# Patient Record
Sex: FEMALE | NOT HISPANIC OR LATINO | URBAN - METROPOLITAN AREA
[De-identification: names, ages, dates, MRNs, and addresses within clinical notes are randomized per-mention and may not be internally consistent; named-entity substitution may affect disease eponyms.]

---

## 2024-04-22 LAB — BCS RECOMMENDATION EXT: NORMAL

## 2024-04-23 LAB — BCS RECOMMENDATION EXT: NORMAL

## 2024-11-11 ENCOUNTER — OFFICE VISIT (OUTPATIENT)
Dept: FAMILY MEDICINE | Facility: CLINIC | Age: 54
End: 2024-11-11
Payer: COMMERCIAL

## 2024-11-11 VITALS
WEIGHT: 215 LBS | HEIGHT: 69 IN | SYSTOLIC BLOOD PRESSURE: 134 MMHG | RESPIRATION RATE: 16 BRPM | OXYGEN SATURATION: 98 % | HEART RATE: 92 BPM | BODY MASS INDEX: 31.84 KG/M2 | DIASTOLIC BLOOD PRESSURE: 86 MMHG | TEMPERATURE: 98 F

## 2024-11-11 DIAGNOSIS — Z11.59 NEED FOR HEPATITIS C SCREENING TEST: ICD-10-CM

## 2024-11-11 DIAGNOSIS — Z13.6 ENCOUNTER FOR SCREENING FOR CARDIOVASCULAR DISORDERS: ICD-10-CM

## 2024-11-11 DIAGNOSIS — Z76.89 ENCOUNTER TO ESTABLISH CARE: Primary | ICD-10-CM

## 2024-11-11 DIAGNOSIS — E78.2 MIXED HYPERLIPIDEMIA: ICD-10-CM

## 2024-11-11 DIAGNOSIS — K63.5 POLYP OF COLON, UNSPECIFIED PART OF COLON, UNSPECIFIED TYPE: ICD-10-CM

## 2024-11-11 DIAGNOSIS — R73.01 ELEVATED FASTING GLUCOSE: ICD-10-CM

## 2024-11-11 DIAGNOSIS — Z12.31 BREAST CANCER SCREENING BY MAMMOGRAM: ICD-10-CM

## 2024-11-11 DIAGNOSIS — E55.9 VITAMIN D DEFICIENCY: ICD-10-CM

## 2024-11-11 DIAGNOSIS — Z82.49 FAMILY HISTORY OF EARLY CAD: ICD-10-CM

## 2024-11-11 DIAGNOSIS — I10 PRIMARY HYPERTENSION: ICD-10-CM

## 2024-11-11 DIAGNOSIS — M51.360 DEGENERATION OF INTERVERTEBRAL DISC OF LUMBAR REGION WITH DISCOGENIC BACK PAIN: ICD-10-CM

## 2024-11-11 DIAGNOSIS — Z11.4 SCREENING FOR HIV (HUMAN IMMUNODEFICIENCY VIRUS): ICD-10-CM

## 2024-11-11 PROCEDURE — 3075F SYST BP GE 130 - 139MM HG: CPT | Mod: CPTII,,, | Performed by: NURSE PRACTITIONER

## 2024-11-11 PROCEDURE — 1159F MED LIST DOCD IN RCRD: CPT | Mod: CPTII,,, | Performed by: NURSE PRACTITIONER

## 2024-11-11 PROCEDURE — 3079F DIAST BP 80-89 MM HG: CPT | Mod: CPTII,,, | Performed by: NURSE PRACTITIONER

## 2024-11-11 PROCEDURE — 1160F RVW MEDS BY RX/DR IN RCRD: CPT | Mod: CPTII,,, | Performed by: NURSE PRACTITIONER

## 2024-11-11 PROCEDURE — 99204 OFFICE O/P NEW MOD 45 MIN: CPT | Mod: ,,, | Performed by: NURSE PRACTITIONER

## 2024-11-11 PROCEDURE — 4010F ACE/ARB THERAPY RXD/TAKEN: CPT | Mod: CPTII,,, | Performed by: NURSE PRACTITIONER

## 2024-11-11 PROCEDURE — 3008F BODY MASS INDEX DOCD: CPT | Mod: CPTII,,, | Performed by: NURSE PRACTITIONER

## 2024-11-11 RX ORDER — ENALAPRIL MALEATE 10 MG/1
10 TABLET ORAL DAILY
Qty: 90 TABLET | Refills: 3 | Status: SHIPPED | OUTPATIENT
Start: 2024-11-11 | End: 2025-11-11

## 2024-11-11 RX ORDER — ROSUVASTATIN CALCIUM 10 MG/1
10 TABLET, COATED ORAL DAILY
Qty: 90 TABLET | Refills: 3 | Status: SHIPPED | OUTPATIENT
Start: 2024-11-11 | End: 2025-11-11

## 2024-11-11 RX ORDER — ICOSAPENT ETHYL 1 G/1
2 CAPSULE ORAL 2 TIMES DAILY
COMMUNITY
Start: 2024-10-23 | End: 2024-11-11 | Stop reason: SDUPTHER

## 2024-11-11 RX ORDER — ENALAPRIL MALEATE 10 MG/1
10 TABLET ORAL DAILY
COMMUNITY
Start: 2024-10-16 | End: 2024-11-11 | Stop reason: SDUPTHER

## 2024-11-11 RX ORDER — ERGOCALCIFEROL 1.25 MG/1
50000 CAPSULE ORAL
COMMUNITY
Start: 2024-11-04 | End: 2024-11-11 | Stop reason: SDUPTHER

## 2024-11-11 RX ORDER — ICOSAPENT ETHYL 1 G/1
2 CAPSULE ORAL 2 TIMES DAILY
Qty: 360 CAPSULE | Refills: 3 | Status: SHIPPED | OUTPATIENT
Start: 2024-11-11 | End: 2025-11-11

## 2024-11-11 RX ORDER — IBUPROFEN 800 MG/1
800 TABLET ORAL EVERY 6 HOURS PRN
COMMUNITY
Start: 2024-10-16 | End: 2024-11-11 | Stop reason: SDUPTHER

## 2024-11-11 RX ORDER — IBUPROFEN 800 MG/1
800 TABLET ORAL 3 TIMES DAILY PRN
Qty: 90 TABLET | Refills: 11 | Status: SHIPPED | OUTPATIENT
Start: 2024-11-11

## 2024-11-11 RX ORDER — ERGOCALCIFEROL 1.25 MG/1
50000 CAPSULE ORAL
Qty: 24 CAPSULE | Refills: 3 | Status: SHIPPED | OUTPATIENT
Start: 2024-11-11 | End: 2025-11-11

## 2024-11-11 NOTE — ASSESSMENT & PLAN NOTE
Reports remote MRI with bulging discs in her lower back.  Takes ibuprofen 800 mg sparingly as needed.  Completed PT sometime in the past as well.  Will attempt to get previous MRI records.  Okay to refill ibuprofen to take as needed.

## 2024-11-11 NOTE — ASSESSMENT & PLAN NOTE
Coronary artery calcium score screening CT ordered.  Continue Vascepa, add rosuvastatin.  Follow-up 3 months.

## 2024-11-11 NOTE — LETTER
AUTHORIZATION FOR RELEASE OF   CONFIDENTIAL INFORMATION    Dear OLIVIER Hyman,    We are seeing Amber Javed, date of birth 1970, in the clinic at [facility]. Chantel Massey FNP is the patient's PCP. Amber Javed has an outstanding lab/procedure at the time we reviewed her chart. In order to help keep her health information updated, she has authorized us to request the following medical record(s):        (  )  MAMMOGRAM                                      (  )  COLONOSCOPY      (  )  PAP SMEAR                                          (  )  OUTSIDE LAB RESULTS     (  )  DEXA SCAN                                          (  )  EYE EXAM            (  )  FOOT EXAM                                          (  )  ENTIRE RECORD     (  )  OUTSIDE IMMUNIZATIONS                 (X)  LAST OV/LABS/TESTING         Please fax records to [facility], [place], Kathryn F., FNP, [phone]     If you have any questions, please contact us at [phone].           Patient Name: Amber Javed  : 1970  Patient Phone #: [phone]

## 2024-11-11 NOTE — ASSESSMENT & PLAN NOTE
Blood pressure 134/86 in clinic today.  Does not normally check it at home but when she does, thinks it is better than that.  Instructed to start monitoring blood pressure at home and record.  Follow-up in 3 months.

## 2024-11-11 NOTE — ASSESSMENT & PLAN NOTE
Lipid panel from July reviewed, LDL and total cholesterol okay but triglycerides were still in the 400s.  Will add rosuvastatin 10 mg daily and add a calcium score screening CT.  Follow-up in 3 months with labs prior.

## 2024-11-11 NOTE — PROGRESS NOTES
Subjective:       Patient ID: Amber Javed is a 53 y.o. female.    Chief Complaint: Establish Care (Needs new pcp)      HPI   This is a 53-year-old white female who presents to the clinic today to establish care.  Patient reports she is currently being treated for hypertension, high cholesterol, vitamin-D deficiency, low back pain.  Reports she was having to go to her doctor every 3 months and get blood work every 3 months and it was beginning to become expensive with all the time she was missing from work.  Patient reports family history of CAD.  Dad with stents in his late 40s or early 50s.  All of his brothers have heart disease and stents as well.  Patient has never had any cardiac workup.  She denies any chest pain or shortness a breath but does not do much physical activity.  Has sedentary job and pretty sedentary lifestyle.  Reports colonoscopy up-to-date, had 9 polyps last time, does colonoscopy every 3 years with Dr. Gale.  Reports hysterectomy last year with Dr. Garcia for heavy menstrual cycles.  Mammogram up-to-date at [facility] per Dr. Garcia.  Review of Systems  Comprehensive review of systems negative except as stated in HPI    The patient's Health Maintenance was reviewed and the following appears to be due:   Health Maintenance Due   Topic Date Due    Hepatitis C Screening  Never done    Lipid Panel  Never done    HIV Screening  Never done    Mammogram  Never done    Hemoglobin A1c (Diabetic Prevention Screening)  Never done    Colorectal Cancer Screening  Never done       Past Medical History:  History reviewed. No pertinent past medical history.  Past Surgical History:   Procedure Laterality Date    TOTAL ABDOMINAL HYSTERECTOMY  03/2023     Review of patient's allergies indicates:  No Known Allergies  Current Outpatient Medications on File Prior to Visit   Medication Sig Dispense Refill    [DISCONTINUED] enalapril (VASOTEC) 10 MG tablet Take 10 mg by mouth once daily.      [DISCONTINUED]  "ergocalciferol (ERGOCALCIFEROL) 50,000 unit Cap Take 50,000 Units by mouth twice a week.      [DISCONTINUED] ibuprofen (ADVIL,MOTRIN) 800 MG tablet Take 800 mg by mouth every 6 (six) hours as needed for Pain.      [DISCONTINUED] icosapent ethyL (VASCEPA) 1 gram Cap Take 2 capsules by mouth 2 (two) times daily.       No current facility-administered medications on file prior to visit.     Social History     Socioeconomic History    Marital status: Single   Tobacco Use    Smoking status: Former     Types: Cigarettes    Smokeless tobacco: Never   Substance and Sexual Activity    Alcohol use: Yes    Drug use: Never    Sexual activity: Yes     Partners: Male     Family History   Problem Relation Name Age of Onset    Asthma Mother      Heart attack Father         Objective:       /86 (BP Location: Left arm)   Pulse 92   Temp 98.1 °F (36.7 °C) (Oral)   Resp 16   Ht 5' 9" (1.753 m)   Wt 97.5 kg (215 lb)   SpO2 98%   BMI 31.75 kg/m²      Physical Exam  Vitals and nursing note reviewed.   Constitutional:       Appearance: Normal appearance. She is obese.   HENT:      Head: Normocephalic and atraumatic.      Right Ear: Tympanic membrane, ear canal and external ear normal.      Left Ear: Tympanic membrane, ear canal and external ear normal.      Nose: Nose normal.      Mouth/Throat:      Mouth: Mucous membranes are moist.      Pharynx: Oropharynx is clear.   Eyes:      Extraocular Movements: Extraocular movements intact.      Conjunctiva/sclera: Conjunctivae normal.      Pupils: Pupils are equal, round, and reactive to light.   Cardiovascular:      Rate and Rhythm: Normal rate and regular rhythm.      Heart sounds: Normal heart sounds.   Pulmonary:      Effort: Pulmonary effort is normal.      Breath sounds: Normal breath sounds.   Musculoskeletal:         General: Normal range of motion.      Cervical back: Normal range of motion and neck supple.   Skin:     General: Skin is warm and dry.   Neurological:      " General: No focal deficit present.      Mental Status: She is alert and oriented to person, place, and time.   Psychiatric:         Mood and Affect: Mood normal.         Behavior: Behavior normal.         Thought Content: Thought content normal.         Judgment: Judgment normal.             Assessment and Plan       ICD-10-CM ICD-9-CM   1. Encounter to establish care  Z76.89 V65.8   2. Primary hypertension  I10 401.9   3. Vitamin D deficiency  E55.9 268.9   4. Mixed hyperlipidemia  E78.2 272.2   5. Family history of early CAD  Z82.49 V17.3   6. Polyp of colon, unspecified part of colon, unspecified type  K63.5 211.3   7. Degeneration of intervertebral disc of lumbar region with discogenic back pain  M51.360 722.52   8. Encounter for screening for cardiovascular disorders  Z13.6 V81.2   9. Need for hepatitis C screening test  Z11.59 V73.89   10. Screening for HIV (human immunodeficiency virus)  Z11.4 V73.89   11. Elevated fasting glucose  R73.01 790.21   12. Breast cancer screening by mammogram  Z12.31 V76.12        1. Encounter to establish care    2. Primary hypertension  Overview:  Enalapril 10 mg daily    Assessment & Plan:  Blood pressure 134/86 in clinic today.  Does not normally check it at home but when she does, thinks it is better than that.  Instructed to start monitoring blood pressure at home and record.  Follow-up in 3 months.    Orders:  -     enalapril (VASOTEC) 10 MG tablet; Take 1 tablet (10 mg total) by mouth once daily.  Dispense: 90 tablet; Refill: 3    3. Vitamin D deficiency  Overview:  Vitamin-D 68823 units twice weekly    Assessment & Plan:  Vitamin-D level in 3 months.    Orders:  -     ergocalciferol (ERGOCALCIFEROL) 50,000 unit Cap; Take 1 capsule (50,000 Units total) by mouth twice a week.  Dispense: 24 capsule; Refill: 3  -     Vitamin D; Future; Expected date: 02/11/2025    4. Mixed hyperlipidemia  Overview:  Vascepa 2 g twice daily    11/11/2024 -add rosuvastatin 10 mg  daily    Assessment & Plan:  Lipid panel from July reviewed, LDL and total cholesterol okay but triglycerides were still in the 400s.  Will add rosuvastatin 10 mg daily and add a calcium score screening CT.  Follow-up in 3 months with labs prior.    Orders:  -     rosuvastatin (CRESTOR) 10 MG tablet; Take 1 tablet (10 mg total) by mouth once daily.  Dispense: 90 tablet; Refill: 3  -     icosapent ethyL (VASCEPA) 1 gram Cap; Take 2 capsules (2,000 mg total) by mouth 2 (two) times daily.  Dispense: 360 capsule; Refill: 3  -     Comprehensive Metabolic Panel; Future; Expected date: 02/11/2025  -     Lipid Panel; Future; Expected date: 02/11/2025    5. Family history of early CAD  Overview:  Dad with stents starting late 40's early 50's  Multiple paternal uncles with CAD as well     Assessment & Plan:  Coronary artery calcium score screening CT ordered.  Continue Vascepa, add rosuvastatin.  Follow-up 3 months.      6. Polyp of colon, unspecified part of colon, unspecified type  Overview:  Dr Gale     Assessment & Plan:  Colonoscopy records requested      7. Degeneration of intervertebral disc of lumbar region with discogenic back pain  Assessment & Plan:  Reports remote MRI with bulging discs in her lower back.  Takes ibuprofen 800 mg sparingly as needed.  Completed PT sometime in the past as well.  Will attempt to get previous MRI records.  Okay to refill ibuprofen to take as needed.    Orders:  -     ibuprofen (ADVIL,MOTRIN) 800 MG tablet; Take 1 tablet (800 mg total) by mouth 3 (three) times daily as needed for Pain.  Dispense: 90 tablet; Refill: 11    8. Encounter for screening for cardiovascular disorders  -     CT Calcium Scoring Cardiac; Future; Expected date: 02/11/2025    9. Need for hepatitis C screening test  -     Hepatitis C Antibody; Future; Expected date: 02/11/2025    10. Screening for HIV (human immunodeficiency virus)  -     HIV 1/2 Ag/Ab (4th Gen); Future; Expected date: 02/11/2025    11. Elevated  fasting glucose  -     Hemoglobin A1C; Future; Expected date: 02/11/2025    12. Breast cancer screening by mammogram  Assessment & Plan:  Mammogram up-to-date at [facility], records requested             Follow up in about 3 months (around 2/11/2025) for follow up.

## 2024-11-11 NOTE — LETTER
AUTHORIZATION FOR RELEASE OF   CONFIDENTIAL INFORMATION    Dear Dr Garcia,    We are seeing Amber Javed, date of birth 1970, in the clinic at [facility]. Chantel Massey FNP is the patient's PCP. Amber Javed has an outstanding lab/procedure at the time we reviewed her chart. In order to help keep her health information updated, she has authorized us to request the following medical record(s):        (X)  MAMMOGRAM                                      (  )  COLONOSCOPY      (  )  PAP SMEAR                                          (  )  OUTSIDE LAB RESULTS     (  )  DEXA SCAN                                          (  )  EYE EXAM            (  )  FOOT EXAM                                          (  )  ENTIRE RECORD     (  )  OUTSIDE IMMUNIZATIONS                 (  )  _______________         Please fax records to [facility], [place], Kathryn F., FNP, [phone]     If you have any questions, please contact us at [phone].           Patient Name: Amber Javed  : 1970  Patient Phone #: [phone]

## 2024-11-11 NOTE — LETTER
AUTHORIZATION FOR RELEASE OF   CONFIDENTIAL INFORMATION    Dear Dr Gale,    We are seeing Amber Javed, date of birth 1970, in the clinic at [facility]. Chantel Massey FNP is the patient's PCP. Amber Javed has an outstanding lab/procedure at the time we reviewed her chart. In order to help keep her health information updated, she has authorized us to request the following medical record(s):        (  )  MAMMOGRAM                                      (X)  COLONOSCOPY      (  )  PAP SMEAR                                          (  )  OUTSIDE LAB RESULTS     (  )  DEXA SCAN                                          (  )  EYE EXAM            (  )  FOOT EXAM                                          (  )  ENTIRE RECORD     (  )  OUTSIDE IMMUNIZATIONS                 (  )  _______________         Please fax records to [facility], [place], Kathryn F., FNP, [phone]     If you have any questions, please contact us at [phone].           Patient Name: Amber Javed  : 1970  Patient Phone #: [phone]

## 2024-11-12 ENCOUNTER — DOCUMENTATION ONLY (OUTPATIENT)
Dept: FAMILY MEDICINE | Facility: CLINIC | Age: 54
End: 2024-11-12
Payer: COMMERCIAL

## 2024-11-14 ENCOUNTER — DOCUMENTATION ONLY (OUTPATIENT)
Dept: FAMILY MEDICINE | Facility: CLINIC | Age: 54
End: 2024-11-14
Payer: COMMERCIAL

## 2024-11-14 LAB — CRC RECOMMENDATION EXT: NORMAL

## 2025-02-03 ENCOUNTER — HOSPITAL ENCOUNTER (OUTPATIENT)
Dept: RADIOLOGY | Facility: HOSPITAL | Age: 55
Discharge: HOME | End: 2025-02-03
Attending: NURSE PRACTITIONER
Payer: COMMERCIAL

## 2025-02-03 DIAGNOSIS — Z13.6 ENCOUNTER FOR SCREENING FOR CARDIOVASCULAR DISORDERS: ICD-10-CM

## 2025-02-03 PROBLEM — R91.8 PULMONARY NODULES: Status: ACTIVE | Noted: 2025-02-03

## 2025-02-03 PROBLEM — R93.1 ELEVATED CORONARY ARTERY CALCIUM SCORE: Status: ACTIVE | Noted: 2025-02-03

## 2025-02-03 PROCEDURE — 75571 CT HRT W/O DYE W/CA TEST: CPT | Mod: TC

## 2025-02-03 NOTE — PROGRESS NOTES
I did review your coronary artery calcium CT results.  As discussed at your previous visit, since your coronary artery calcium score is elevated, I would like to refer you to a cardiologist duty or family history.  We will discuss this more at your visit when you come in, I will not send a referral until I discuss with you in person.  I also saw the incidentally noted small pulmonary nodules.  We will discuss if we need to repeat the CT scan in a year for surveillance or not when you come in as well.  Just wanted to touch base with you, do not stress about these findings.  We will discuss them in detail when you come in.

## 2025-02-11 ENCOUNTER — OFFICE VISIT (OUTPATIENT)
Dept: FAMILY MEDICINE | Facility: CLINIC | Age: 55
End: 2025-02-11
Payer: COMMERCIAL

## 2025-02-11 ENCOUNTER — TELEPHONE (OUTPATIENT)
Dept: FAMILY MEDICINE | Facility: CLINIC | Age: 55
End: 2025-02-11

## 2025-02-11 VITALS
TEMPERATURE: 98 F | HEART RATE: 75 BPM | DIASTOLIC BLOOD PRESSURE: 85 MMHG | RESPIRATION RATE: 16 BRPM | OXYGEN SATURATION: 97 % | WEIGHT: 215 LBS | HEIGHT: 69 IN | SYSTOLIC BLOOD PRESSURE: 117 MMHG | BODY MASS INDEX: 31.84 KG/M2

## 2025-02-11 DIAGNOSIS — M51.360 DEGENERATION OF INTERVERTEBRAL DISC OF LUMBAR REGION WITH DISCOGENIC BACK PAIN: ICD-10-CM

## 2025-02-11 DIAGNOSIS — K63.5 POLYP OF COLON, UNSPECIFIED PART OF COLON, UNSPECIFIED TYPE: ICD-10-CM

## 2025-02-11 DIAGNOSIS — Z00.00 ANNUAL PHYSICAL EXAM: ICD-10-CM

## 2025-02-11 DIAGNOSIS — R93.1 ELEVATED CORONARY ARTERY CALCIUM SCORE: ICD-10-CM

## 2025-02-11 DIAGNOSIS — Z82.49 FAMILY HISTORY OF EARLY CAD: ICD-10-CM

## 2025-02-11 DIAGNOSIS — Z12.2 SCREENING FOR MALIGNANT NEOPLASM OF RESPIRATORY ORGAN: ICD-10-CM

## 2025-02-11 DIAGNOSIS — Z11.59 NEED FOR HEPATITIS C SCREENING TEST: ICD-10-CM

## 2025-02-11 DIAGNOSIS — Z11.4 SCREENING FOR HIV (HUMAN IMMUNODEFICIENCY VIRUS): ICD-10-CM

## 2025-02-11 DIAGNOSIS — E66.811 CLASS 1 OBESITY DUE TO EXCESS CALORIES WITH SERIOUS COMORBIDITY AND BODY MASS INDEX (BMI) OF 31.0 TO 31.9 IN ADULT: ICD-10-CM

## 2025-02-11 DIAGNOSIS — E55.9 VITAMIN D DEFICIENCY: ICD-10-CM

## 2025-02-11 DIAGNOSIS — R91.8 PULMONARY NODULES: Primary | ICD-10-CM

## 2025-02-11 DIAGNOSIS — E66.09 CLASS 1 OBESITY DUE TO EXCESS CALORIES WITH SERIOUS COMORBIDITY AND BODY MASS INDEX (BMI) OF 31.0 TO 31.9 IN ADULT: Primary | ICD-10-CM

## 2025-02-11 DIAGNOSIS — E66.09 CLASS 1 OBESITY DUE TO EXCESS CALORIES WITH SERIOUS COMORBIDITY AND BODY MASS INDEX (BMI) OF 31.0 TO 31.9 IN ADULT: ICD-10-CM

## 2025-02-11 DIAGNOSIS — E78.2 MIXED HYPERLIPIDEMIA: ICD-10-CM

## 2025-02-11 DIAGNOSIS — R73.03 PREDIABETES: ICD-10-CM

## 2025-02-11 DIAGNOSIS — Z12.31 BREAST CANCER SCREENING BY MAMMOGRAM: ICD-10-CM

## 2025-02-11 DIAGNOSIS — E66.811 CLASS 1 OBESITY DUE TO EXCESS CALORIES WITH SERIOUS COMORBIDITY AND BODY MASS INDEX (BMI) OF 31.0 TO 31.9 IN ADULT: Primary | ICD-10-CM

## 2025-02-11 DIAGNOSIS — I10 PRIMARY HYPERTENSION: ICD-10-CM

## 2025-02-11 PROCEDURE — 1159F MED LIST DOCD IN RCRD: CPT | Mod: CPTII,,, | Performed by: NURSE PRACTITIONER

## 2025-02-11 PROCEDURE — 3044F HG A1C LEVEL LT 7.0%: CPT | Mod: CPTII,,, | Performed by: NURSE PRACTITIONER

## 2025-02-11 PROCEDURE — 3008F BODY MASS INDEX DOCD: CPT | Mod: CPTII,,, | Performed by: NURSE PRACTITIONER

## 2025-02-11 PROCEDURE — 99214 OFFICE O/P EST MOD 30 MIN: CPT | Mod: ,,, | Performed by: NURSE PRACTITIONER

## 2025-02-11 PROCEDURE — 3079F DIAST BP 80-89 MM HG: CPT | Mod: CPTII,,, | Performed by: NURSE PRACTITIONER

## 2025-02-11 PROCEDURE — 1160F RVW MEDS BY RX/DR IN RCRD: CPT | Mod: CPTII,,, | Performed by: NURSE PRACTITIONER

## 2025-02-11 PROCEDURE — 3074F SYST BP LT 130 MM HG: CPT | Mod: CPTII,,, | Performed by: NURSE PRACTITIONER

## 2025-02-11 PROCEDURE — G2211 COMPLEX E/M VISIT ADD ON: HCPCS | Mod: ,,, | Performed by: NURSE PRACTITIONER

## 2025-02-11 PROCEDURE — 4010F ACE/ARB THERAPY RXD/TAKEN: CPT | Mod: CPTII,,, | Performed by: NURSE PRACTITIONER

## 2025-02-11 RX ORDER — FLUTICASONE PROPIONATE 50 MCG
1 SPRAY, SUSPENSION (ML) NASAL 2 TIMES DAILY
COMMUNITY
Start: 2025-01-14

## 2025-02-11 RX ORDER — FENOFIBRATE 145 MG/1
145 TABLET, FILM COATED ORAL DAILY
Qty: 30 TABLET | Refills: 11 | Status: SHIPPED | OUTPATIENT
Start: 2025-02-11 | End: 2026-02-11

## 2025-02-11 RX ORDER — HYDROCHLOROTHIAZIDE 12.5 MG/1
12.5 TABLET ORAL DAILY
COMMUNITY
Start: 2025-01-12

## 2025-02-11 NOTE — ASSESSMENT & PLAN NOTE
Discussed potential for starting semaglutide through [facility], patient would like to think about it and get back with me.  Did recommend lifestyle modification including calorie restricted diet and increased exercise as well.

## 2025-02-11 NOTE — PATIENT INSTRUCTIONS
Understanding the Importance of Coronary Calcium Scores in Assessing Heart Health    When it comes to heart health, understanding your risk for cardiovascular disease is crucial. One of the tools that can help identify potential heart issues is a coronary calcium score. This score is derived from a specialized CT scan that detects the presence and extent of calcified plaque in your coronary arteries. Just like a  uses a diagnostic tool to assess a car's engine, the coronary calcium score helps your doctor evaluate your heart's condition.    What is plaque and why is It important?    Imagine your arteries as highways that transport blood throughout your body. Over time, due to factors like poor diet, lack of exercise or genetics, traffic jams can occur in the form of plaque--a mix of fat, calcium and other substances. When this plaque hardens, it can narrow or block your arteries, much like a buildup of debris can clog a highway. This can lead to serious conditions such as heart attacks or strokes.    What is a coronary calcium score?    The coronary calcium score quantifies the amount of calcified plaque in your coronary arteries. It provides a numerical value that helps predict your risk of developing heart disease. The higher the score, the greater the risk. Here's a breakdown of what the scores mean:    Score of 0: No detectable plaque. This suggests a low risk of heart disease.  Score of 1-100: Small amount of plaque. You have mild heart disease risk.  Score of 101-400: Moderate amount of plaque. Your risk is moderate to high.  Score of 400 or more: Extensive plaque. This indicates a high risk of a cardiac event.        Who should consider getting a coronary calcium score test?    The coronary calcium score is particularly useful for individuals who are trying to understand their cardiac risk, especially if they are not currently on cholesterol-lowering medications. Here are some guidelines on who  might benefit from this test:    Age Group: Generally, men aged 40-70 and women aged 50-70 may consider this test, especially if they have risk factors for heart disease.  Risk Factors: People with a family history of heart disease, high cholesterol, high blood pressure, diabetes or those who smoke.  Uncertain Risk: If your doctor is uncertain about your risk for heart disease, this test can provide additional information to guide treatment decisions.    Benefits of knowing your coronary calcium score    Understanding your coronary calcium score can offer benefits such as:    Personalized Treatment Plans: It helps your doctor tailor a treatment plan that might include lifestyle changes or medications.  Motivation for Lifestyle Changes: Knowing your score can be a powerful motivator to adopt healthier habits, such as improving your diet, exercising more or quitting smoking.    Conclusion    A coronary calcium score is a valuable tool in assessing your heart health. By understanding the amount of plaque in your arteries, you and your doctor can make informed decisions about your treatment and lifestyle. Remember, much like maintaining a clear highway is vital for smooth travel, keeping your arteries free of plaque is essential for a healthy heart. If you're concerned about your heart health and want more detailed insights, speak to your doctor about whether a coronary calcium score test might be right for you.

## 2025-02-11 NOTE — PROGRESS NOTES
Subjective:       Patient ID: Amber Javed is a 54 y.o. female.    Chief Complaint: Hypertension (3m fu), Vitamin D Deficiency (3m fu), and Hyperlipidemia (3m fu)      History of Present Illness    CHIEF COMPLAINT:  Patient presents today for multiple follow-ups    CARDIOVASCULAR:  She has an elevated coronary artery calcium score of 70. Family history is significant for cardiac disease with father requiring cardiac stents in  his 40s and 50s and multiple paternal uncles with stents.  LDL and total cholesterol are at goal, but triglycerides remain elevated. She continues Vascepa 2 capsules twice daily but reports poor compliance due to twice daily dosing.    PULMONARY:  She has small pulmonary nodules in bilateral lungs measuring 5mm or less, discovered incidentally on coronary artery calcium score imaging. She has a 31.8 pack-year smoking history, having smoked one pack per day from November 1992 until September 2022.    PREDIABETES:  She has prediabetes with glucose 123 mg/dL and HbA1c 5.8%. She reports preferring salty foods over sweets.    GASTROINTESTINAL:  Colonoscopy in 2022 revealed eight polyps, with several identified as precancerous tubular adenomas.    MUSCULOSKELETAL:  She reports back pain that varies in intensity with prolonged sitting and activity level. She manages symptoms with as-needed ibuprofen.        Review of Systems  Comprehensive review of systems negative except as stated in HPI    The patient's Health Maintenance was reviewed and the following appears to be due:   Health Maintenance Due   Topic Date Due    Pneumococcal Vaccines (Age 50+) (1 of 2 - PCV) Never done    Mammogram  04/23/2025       Past Medical History:  History reviewed. No pertinent past medical history.  Past Surgical History:   Procedure Laterality Date    TOTAL ABDOMINAL HYSTERECTOMY  03/2023     Review of patient's allergies indicates:  No Known Allergies  Current Outpatient Medications on File Prior to Visit    Medication Sig Dispense Refill    enalapril (VASOTEC) 10 MG tablet Take 1 tablet (10 mg total) by mouth once daily. 90 tablet 3    ergocalciferol (ERGOCALCIFEROL) 50,000 unit Cap Take 1 capsule (50,000 Units total) by mouth twice a week. 24 capsule 3    fluticasone propionate (FLONASE) 50 mcg/actuation nasal spray 1 spray by Each Nostril route 2 (two) times daily.      hydroCHLOROthiazide 12.5 MG Tab Take 12.5 mg by mouth once daily.      ibuprofen (ADVIL,MOTRIN) 800 MG tablet Take 1 tablet (800 mg total) by mouth 3 (three) times daily as needed for Pain. 90 tablet 11    rosuvastatin (CRESTOR) 10 MG tablet Take 1 tablet (10 mg total) by mouth once daily. 90 tablet 3    [DISCONTINUED] icosapent ethyL (VASCEPA) 1 gram Cap Take 2 capsules (2,000 mg total) by mouth 2 (two) times daily. 360 capsule 3     No current facility-administered medications on file prior to visit.     Social History     Socioeconomic History    Marital status: Single   Tobacco Use    Smoking status: Former     Types: Cigarettes    Smokeless tobacco: Never   Substance and Sexual Activity    Alcohol use: Yes    Drug use: Never    Sexual activity: Yes     Partners: Male     Social Drivers of Health     Financial Resource Strain: Patient Declined (2/4/2025)    Overall Financial Resource Strain (CARDIA)     Difficulty of Paying Living Expenses: Patient declined   Food Insecurity: Patient Declined (2/4/2025)    Hunger Vital Sign     Worried About Running Out of Food in the Last Year: Patient declined     Ran Out of Food in the Last Year: Patient declined   Physical Activity: Inactive (2/4/2025)    Exercise Vital Sign     Days of Exercise per Week: 1 day     Minutes of Exercise per Session: 0 min   Stress: Stress Concern Present (2/4/2025)    [employer] - Occupational Stress Questionnaire     Feeling of Stress : Very much   Housing Stability: High Risk (2/4/2025)    Housing Stability Vital Sign     Unable to Pay for  "Housing in the Last Year: Yes     Family History   Problem Relation Name Age of Onset    Asthma Mother      Heart attack Father         Objective:       /85 (BP Location: Left arm)   Pulse 75   Temp 98.1 °F (36.7 °C) (Oral)   Resp 16   Ht 5' 9" (1.753 m)   Wt 97.5 kg (215 lb)   SpO2 97%   BMI 31.75 kg/m²      Physical Exam  Vitals and nursing note reviewed.   Constitutional:       Appearance: Normal appearance. She is obese.   HENT:      Head: Normocephalic and atraumatic.      Right Ear: Tympanic membrane, ear canal and external ear normal.      Left Ear: Tympanic membrane, ear canal and external ear normal.      Nose: Nose normal.      Mouth/Throat:      Mouth: Mucous membranes are moist.      Pharynx: Oropharynx is clear.   Eyes:      Extraocular Movements: Extraocular movements intact.      Conjunctiva/sclera: Conjunctivae normal.      Pupils: Pupils are equal, round, and reactive to light.   Cardiovascular:      Rate and Rhythm: Normal rate and regular rhythm.      Heart sounds: Normal heart sounds.   Pulmonary:      Effort: Pulmonary effort is normal.      Breath sounds: Normal breath sounds.   Musculoskeletal:         General: Normal range of motion.      Cervical back: Normal range of motion and neck supple.   Skin:     General: Skin is warm and dry.   Neurological:      General: No focal deficit present.      Mental Status: She is alert and oriented to person, place, and time.   Psychiatric:         Mood and Affect: Mood normal.         Behavior: Behavior normal.         Thought Content: Thought content normal.         Judgment: Judgment normal.         Labs  Lab Visit on 02/03/2025   Component Date Value Ref Range Status    Sodium 02/03/2025 140  136 - 145 mmol/L Final    Potassium 02/03/2025 4.6  3.5 - 5.1 mmol/L Final    Chloride 02/03/2025 106  98 - 107 mmol/L Final    CO2 02/03/2025 27  22 - 29 mmol/L Final    Glucose 02/03/2025 123 (H)  74 - 100 mg/dL Final    Blood Urea Nitrogen " 02/03/2025 18.7  9.8 - 20.1 mg/dL Final    Creatinine 02/03/2025 0.81  0.55 - 1.02 mg/dL Final    Calcium 02/03/2025 10.1  8.4 - 10.2 mg/dL Final    Protein Total 02/03/2025 7.3  6.4 - 8.3 gm/dL Final    Albumin 02/03/2025 4.2  3.5 - 5.0 g/dL Final    Globulin 02/03/2025 3.1  2.4 - 3.5 gm/dL Final    Albumin/Globulin Ratio 02/03/2025 1.4  1.1 - 2.0 ratio Final    Bilirubin Total 02/03/2025 0.4  <=1.5 mg/dL Final    ALP 02/03/2025 49  40 - 150 unit/L Final    ALT 02/03/2025 70 (H)  0 - 55 unit/L Final    AST 02/03/2025 30  5 - 34 unit/L Final    eGFR 02/03/2025 >60  mL/min/1.73/m2 Final    Estimated GFR calculated using the CKD-EPI creatinine (2021) equation.    Anion Gap 02/03/2025 7.0  mEq/L Final    BUN/Creatinine Ratio 02/03/2025 23   Final    Cholesterol Total 02/03/2025 145  <=200 mg/dL Final    HDL Cholesterol 02/03/2025 27 (L)  35 - 60 mg/dL Final    Triglyceride 02/03/2025 246 (H)  37 - 140 mg/dL Final    Cholesterol/HDL Ratio 02/03/2025 5  0 - 5 Final    Very Low Density Lipoprotein 02/03/2025 49   Final    LDL Cholesterol 02/03/2025 69.00  50.00 - 140.00 mg/dL Final    LDL calculated using the Friedewald equation.    Vitamin D 02/03/2025 48  30 - 80 ng/mL Final    Hep C Ab Interp 02/03/2025 Nonreactive  Nonreactive Final    HIV 02/03/2025 Nonreactive  Nonreactive Final    Hemoglobin A1c 02/03/2025 5.8  <=7.0 % Final    Estimated Average Glucose 02/03/2025 119.8  mg/dL Final   Documentation Only on 11/14/2024   Component Date Value Ref Range Status    CRC Recommendation External 11/14/2024 No follow-up frequency specified   Final   Documentation Only on 11/12/2024   Component Date Value Ref Range Status    BCS Recommendation External 04/23/2024 Repeat mammogram in 1 year   Final    BCS Recommendation External 04/22/2024 Additional testing recommended   Final       Assessment and Plan     Assessment & Plan    R91.8 Pulmonary nodules  I10 Primary hypertension  E78.2 Mixed hyperlipidemia  Z82.49 Family history  of early CAD  R93.1 Elevated coronary artery calcium score  Z12.31 Breast cancer screening by mammogram  E55.9 Vitamin D deficiency  K63.5 Polyp of colon, unspecified part of colon, unspecified type  Z11.59 Need for hepatitis C screening test  Z11.4 Screening for HIV (human immunodeficiency virus)  Z00.00 Annual physical exam  R73.03 Prediabetes  Z12.2 Screening for malignant neoplasm of respiratory organ  M51.360 Degeneration of intervertebral disc of lumbar region with discogenic back pain    IMPRESSION:  - Elevated coronary artery calcium score (70) with family history of cardiac disease warrants cardiology referral  - Pulmonary nodules (5mm or less) found on CT require yearly monitoring due to patient's smoking history  - Diagnosed prediabetes based on elevated glucose (123) and HbA1c (5.8)  - Slightly elevated liver enzyme (ALT 70) to be monitored  - Considered weight loss medication (semaglutide) for obesity management and potential improvement of prediabetes and triglycerides    I10 PRIMARY HYPERTENSION:  - Continued Enalapril and Hydrochlorothiazide.    E78.2 MIXED HYPERLIPIDEMIA:  - Initiated Fenofibrate 145 mg daily.  - Discontinued Vascepa 2 capsules twice daily.  - Continued rosuvastatin    Z82.49 FAMILY HISTORY OF EARLY CAD:  - Referred the patient to Cardiology (Dr. Alejandra Wilder or Dr Silva in [place] for evaluation of family history of cardiac disease and elevated coronary artery calcium score.    R93.1 ELEVATED CORONARY ARTERY CALCIUM SCORE:  - Educated the patient about coronary artery calcium score ranges and their implications for heart disease risk.    E55.9 VITAMIN D DEFICIENCY:  - Continued Vitamin D 50,000 units twice weekly.    K63.5 POLYP OF COLON, UNSPECIFIED PART OF COLON, UNSPECIFIED TYPE:  - Instructed the patient to contact Dr. Gale's office to schedule overdue colonoscopy (due February 2025).    Z00.00 ANNUAL PHYSICAL EXAM:  - Scheduled follow up in 6 months for wellness  visit and to recheck labs, including lipid panel and glucose/HbA1c.    R73.03 PREDIABETES:  - Discussed prediabetes diagnosis with the patient, including HbA1c ranges for normal, prediabetes, and diabetes.  - Provided information about compound semaglutide as a weight loss option, including potential side effects and benefits.  - Instructed the patient to message through the patient portal if interested in starting compound semaglutide for weight loss.    Z12.2 SCREENING FOR MALIGNANT NEOPLASM OF RESPIRATORY ORGAN:  - Scheduled low dose lung cancer screening CT for February 2026 at [facility].    M51.360 DEGENERATION OF INTERVERTEBRAL DISC OF LUMBAR REGION WITH DISCOGENIC BACK PAIN:  - Advised the patient to maintain current back pain management with as-needed ibuprofen use.  - Continued Ibuprofen 800 mg as needed for back pain.           1. Pulmonary nodules  Overview:  02/03/2025 - coronary artery calcium score CT with incidentally noted few small solid pulmonary nodules in both lungs.  These measure 5 mm or less.  If the patient is at increased risk for lung malignancy, and no remote CT scans of the chest are available to document stability, a one year followup chest CT can be considered for surveillance purposes. If there is no increased risk for lung malignancy, no further followup is necessary for these nodules.       Orders:  -     CT Chest Lung Screening Low Dose; Future; Expected date: 02/11/2026    2. Primary hypertension  Overview:  Enalapril 10 mg daily      3. Mixed hyperlipidemia  Overview:  Previously on Vascepa 2 g twice daily with poor compliance due to twice daily dosing    11/11/2024 -add rosuvastatin 10 mg daily    02/11/2025 - discontinue Vascepa, add fenofibrate 145 mg daily    Orders:  -     fenofibrate (TRICOR) 145 MG tablet; Take 1 tablet (145 mg total) by mouth once daily.  Dispense: 30 tablet; Refill: 11    4. Family history of early CAD  Overview:  Dad with stents starting late 40's  early 50's  Multiple paternal uncles with CAD as well     Orders:  -     Ambulatory referral/consult to Cardiology; Future; Expected date: 02/18/2025    5. Elevated coronary artery calcium score  Overview:  02/03/2025 - CACS 70    Orders:  -     Ambulatory referral/consult to Cardiology; Future; Expected date: 02/18/2025    6. Breast cancer screening by mammogram  -     Mammo Digital Screening Bilat w/ Juan M; Future; Expected date: 04/24/2025    7. Vitamin D deficiency  Overview:  Vitamin-D 31838 units twice weekly    Orders:  -     Vitamin D; Future; Expected date: 08/11/2025    8. Polyp of colon, unspecified part of colon, unspecified type  Overview:  Dr Gale   Colonoscopy 02/01/2022 - 8 polyps, repeat 3 year (February 2025)      9. Need for hepatitis C screening test  Comments:  Nonreactive    10. Screening for HIV (human immunodeficiency virus)  Comments:  Nonreactive    11. Annual physical exam  -     CBC Auto Differential; Future; Expected date: 08/11/2025  -     Comprehensive Metabolic Panel; Future; Expected date: 08/11/2025  -     Lipid Panel; Future; Expected date: 08/11/2025  -     TSH; Future; Expected date: 08/11/2025    12. Prediabetes  Overview:  Diet controlled     Orders:  -     Hemoglobin A1C; Future; Expected date: 08/11/2025    13. Screening for malignant neoplasm of respiratory organ  -     CT Chest Lung Screening Low Dose; Future; Expected date: 02/11/2026    14. Degeneration of intervertebral disc of lumbar region with discogenic back pain    15. Class 1 obesity due to excess calories with serious comorbidity and body mass index (BMI) of 31.0 to 31.9 in adult  Overview:  02/11/2025 - 215 lb, BMI 31.75    Assessment & Plan:  Discussed potential for starting semaglutide through [facility], patient would like to think about it and get back with me.  Did recommend lifestyle modification including calorie restricted diet and increased exercise as well.             Follow up in  about 6 months (around 8/11/2025) for Annual.     This note was generated with the assistance of ambient listening technology. Verbal consent was obtained by the patient and accompanying visitor(s) for the recording of patient appointment to facilitate this note. I attest to having reviewed and edited the generated note for accuracy, though some syntax or spelling errors may persist. Please contact the author of this note for any clarification.

## 2025-04-14 ENCOUNTER — PATIENT MESSAGE (OUTPATIENT)
Dept: FAMILY MEDICINE | Facility: CLINIC | Age: 55
End: 2025-04-14
Payer: COMMERCIAL

## 2025-04-14 DIAGNOSIS — I10 PRIMARY HYPERTENSION: Primary | ICD-10-CM

## 2025-04-14 RX ORDER — HYDROCHLOROTHIAZIDE 12.5 MG/1
12.5 TABLET ORAL DAILY
Qty: 30 TABLET | Refills: 11 | Status: SHIPPED | OUTPATIENT
Start: 2025-04-14

## 2025-04-21 ENCOUNTER — DOCUMENTATION ONLY (OUTPATIENT)
Dept: FAMILY MEDICINE | Facility: CLINIC | Age: 55
End: 2025-04-21
Payer: COMMERCIAL

## 2025-04-21 LAB — CRC RECOMMENDATION EXT: NORMAL

## 2025-04-29 ENCOUNTER — RESULTS FOLLOW-UP (OUTPATIENT)
Dept: FAMILY MEDICINE | Facility: CLINIC | Age: 55
End: 2025-04-29

## 2025-04-29 ENCOUNTER — DOCUMENTATION ONLY (OUTPATIENT)
Dept: FAMILY MEDICINE | Facility: CLINIC | Age: 55
End: 2025-04-29
Payer: COMMERCIAL

## 2025-04-29 LAB — BCS RECOMMENDATION EXT: NORMAL

## 2025-05-01 ENCOUNTER — PATIENT MESSAGE (OUTPATIENT)
Dept: FAMILY MEDICINE | Facility: CLINIC | Age: 55
End: 2025-05-01
Payer: COMMERCIAL

## 2025-05-06 ENCOUNTER — TELEPHONE (OUTPATIENT)
Dept: FAMILY MEDICINE | Facility: CLINIC | Age: 55
End: 2025-05-06
Payer: COMMERCIAL

## 2025-05-06 NOTE — TELEPHONE ENCOUNTER
Are there any outstanding task in patient chart?  Y, labs     2. Do we have outstanding/pending referrals?  N     3. Has the patient been seen in an ER, Urgent Care, or admitted since last visit?  N     4. Has patient seen any other healthcare providers since last visit?  N     5. Has patient had any blood work or xrays done since last visit?  N

## 2025-05-13 ENCOUNTER — OFFICE VISIT (OUTPATIENT)
Dept: FAMILY MEDICINE | Facility: CLINIC | Age: 55
End: 2025-05-13
Payer: COMMERCIAL

## 2025-05-13 VITALS
OXYGEN SATURATION: 97 % | RESPIRATION RATE: 16 BRPM | DIASTOLIC BLOOD PRESSURE: 78 MMHG | HEART RATE: 74 BPM | HEIGHT: 69 IN | BODY MASS INDEX: 28.88 KG/M2 | WEIGHT: 195 LBS | SYSTOLIC BLOOD PRESSURE: 115 MMHG

## 2025-05-13 DIAGNOSIS — R93.1 ELEVATED CORONARY ARTERY CALCIUM SCORE: Primary | ICD-10-CM

## 2025-05-13 DIAGNOSIS — E66.09 CLASS 1 OBESITY DUE TO EXCESS CALORIES WITH SERIOUS COMORBIDITY AND BODY MASS INDEX (BMI) OF 31.0 TO 31.9 IN ADULT: ICD-10-CM

## 2025-05-13 DIAGNOSIS — E66.811 CLASS 1 OBESITY DUE TO EXCESS CALORIES WITH SERIOUS COMORBIDITY AND BODY MASS INDEX (BMI) OF 31.0 TO 31.9 IN ADULT: ICD-10-CM

## 2025-05-13 DIAGNOSIS — L30.9 DERMATITIS: ICD-10-CM

## 2025-05-13 DIAGNOSIS — I10 PRIMARY HYPERTENSION: ICD-10-CM

## 2025-05-13 RX ORDER — ASPIRIN 81 MG/1
81 TABLET ORAL DAILY
COMMUNITY
Start: 2025-04-01

## 2025-05-13 RX ORDER — TRIAMCINOLONE ACETONIDE 1 MG/G
CREAM TOPICAL 2 TIMES DAILY
Qty: 80 G | Refills: 1 | Status: SHIPPED | OUTPATIENT
Start: 2025-05-13

## 2025-05-13 NOTE — PROGRESS NOTES
"Subjective:       Patient ID: Amber Javed is a 54 y.o. female.    Chief Complaint: Obesity (3m fu)      History of Present Illness    CHIEF COMPLAINT:  Patient presents today for three-month follow-up regarding weight loss management.    WEIGHT MANAGEMENT:  She reports significant weight loss progress, having lost 20 lbs over 3 months, decreasing from 215 to 195 lbs. She expresses satisfaction with her results.    DERMATOLOGIC:  She presents with a new itchy rash on the neck that began yesterday, described as crusty, black, and dry. She denies associated pain. She recently started using a new face wash.    CARDIOVASCULAR:  She was referred to cardiology due to mildly elevated coronary artery calcium score and family history of early heart attacks. Stress test and echocardiogram were normal.    CURRENT MEDICATIONS:  She takes semaglutide 0.5mg injections for weight management, enalapril 10mg daily and hydrochlorothiazide 12.5mg daily for blood pressure, rosuvastatin 10mg daily for cholesterol, fenofibrate 145mg daily, aspirin 81mg daily, vitamin D 50,000 units twice weekly, and ibuprofen 800mg as needed.        Review of Systems  Comprehensive review of systems negative except as stated in HPI    The patient's Health Maintenance was reviewed and the following appears to be due:   There are no preventive care reminders to display for this patient.    Past Medical History:  History reviewed. No pertinent past medical history.  Past Surgical History:   Procedure Laterality Date    TOTAL ABDOMINAL HYSTERECTOMY  03/2023     Review of patient's allergies indicates:  No Known Allergies  Medications Ordered Prior to Encounter[1]  Social History[2]  Family History   Problem Relation Name Age of Onset    Asthma Mother      Heart attack Father         Objective:       /78 (BP Location: Right arm)   Pulse 74   Resp 16   Ht 5' 9" (1.753 m)   Wt 88.5 kg (195 lb)   SpO2 97%   BMI 28.80 kg/m²      Physical " Exam  Vitals and nursing note reviewed.   Constitutional:       Appearance: Normal appearance.   HENT:      Head: Normocephalic and atraumatic.      Right Ear: Tympanic membrane, ear canal and external ear normal.      Left Ear: Tympanic membrane, ear canal and external ear normal.      Nose: Nose normal.      Mouth/Throat:      Mouth: Mucous membranes are moist.      Pharynx: Oropharynx is clear.   Eyes:      Extraocular Movements: Extraocular movements intact.      Conjunctiva/sclera: Conjunctivae normal.      Pupils: Pupils are equal, round, and reactive to light.   Cardiovascular:      Rate and Rhythm: Normal rate and regular rhythm.      Heart sounds: Normal heart sounds.   Pulmonary:      Effort: Pulmonary effort is normal.      Breath sounds: Normal breath sounds.   Musculoskeletal:         General: Normal range of motion.      Cervical back: Normal range of motion and neck supple.   Skin:     General: Skin is warm and dry.      Comments: Erythematous inflammatory rash noted to posterior neck   Neurological:      General: No focal deficit present.      Mental Status: She is alert and oriented to person, place, and time.   Psychiatric:         Mood and Affect: Mood normal.         Behavior: Behavior normal.         Thought Content: Thought content normal.         Judgment: Judgment normal.             Assessment and Plan     Assessment & Plan    R93.1 Elevated coronary artery calcium score  E66.811, E66.09, Z68.31 Class 1 obesity due to excess calories with serious comorbidity and body mass index (BMI) of 31.0 to 31.9 in adult  I10 Primary hypertension  L30.9 Dermatitis    IMPRESSION:  - Reviewed recent cardiology follow-up, including stress test and echocardiogram results, which were normal.  - Weight loss progress: 20 lbs lost in 3 months, BMI decreased from 31.75 to 28.80.  - BP control: 115/78, improved from previous readings.  - Neck rash, likely contact dermatitis from unknown irritant.  - Initiated  topical steroid cream for rash treatment, to be used for up to 2 weeks then discontinued.  - Increased semaglutide injection to 1 mg (50 units on syringe) from 0.5 mg due to plateau in weight loss effects.    ELEVATED CORONARY ARTERY CALCIUM SCORE:  - Continue aspirin 81 mg daily, rosuvastatin 10 mg daily, and fenofibrate 145 mg daily.    CLASS 1 OBESITY DUE TO EXCESS CALORIES WITH SERIOUS COMORBIDITY AND BODY MASS INDEX (BMI) OF 31.0 TO 31.9 IN ADULT:  - Call [facility] to request prescription fill for semaglutide (phone number provided on discharge instructions).  - Contact office if weight loss plateaus before next appointment to discuss potential dose increase of semaglutide to 1.7 mg.  - Continue vitamin D 50,000 units twice weekly.    PRIMARY HYPERTENSION:  - Continue enalapril 10 mg daily and HCTZ 12.5 mg daily.  - Patient to monitor BP at home, especially if feeling unwell.    DERMATITIS:  - Explained contact dermatitis and possible causes.  - Recommend discontinuing use of new face wash product and using a towel around neck when sweating to prevent skin irritation.  - Continue ibuprofen 800 mg as needed.    LIFESTYLE CHANGES:  - Informed about pneumonia vaccine recommendation change from age 65 to 50.  - Advised about potential side effects of shingles vaccine.  - Follow up for wellness visit with labs in August.           1. Elevated coronary artery calcium score  Overview:  02/03/2025 - CACS 70    Dr Wilder    03/17/2025 - ETT - normal    03/24/2025 - TTE - LVEF 65%. Mild calcification of the aortic valve is noted with adequate cuspal excursion. 4.Mild (1+) mitral regurgitation     Rosuvastatin 10 mg daily  Fenofibrate 145 mg daily  Aspirin 81 mg daily      2. Class 1 obesity due to excess calories with serious comorbidity and body mass index (BMI) of 31.0 to 31.9 in adult  Overview:  02/11/2025 - 215 lb, BMI 31.75, start compound semaglutide from [facility]    05/13/2025 - 195 lb, BMI 28.80,  increase semaglutide to 1 mg weekly from [facility] pharmacy      3. Primary hypertension  Overview:  Enalapril 10 mg daily  HCTZ 12.5 mg daily      4. Dermatitis  -     triamcinolone acetonide 0.1% (KENALOG) 0.1 % cream; Apply topically 2 (two) times daily.  Dispense: 80 g; Refill: 1           Follow up in about 3 months (around 8/18/2025) for Annual.     This note was generated with the assistance of ambient listening technology. Verbal consent was obtained by the patient and accompanying visitor(s) for the recording of patient appointment to facilitate this note. I attest to having reviewed and edited the generated note for accuracy, though some syntax or spelling errors may persist. Please contact the author of this note for any clarification.            [1]   Current Outpatient Medications on File Prior to Visit   Medication Sig Dispense Refill    aspirin (ECOTRIN) 81 MG EC tablet Take 81 mg by mouth once daily.      enalapril (VASOTEC) 10 MG tablet Take 1 tablet (10 mg total) by mouth once daily. 90 tablet 3    ergocalciferol (ERGOCALCIFEROL) 50,000 unit Cap Take 1 capsule (50,000 Units total) by mouth twice a week. 24 capsule 3    fenofibrate (TRICOR) 145 MG tablet Take 1 tablet (145 mg total) by mouth once daily. 30 tablet 11    fluticasone propionate (FLONASE) 50 mcg/actuation nasal spray 1 spray by Each Nostril route 2 (two) times daily.      hydroCHLOROthiazide 12.5 MG Tab Take 1 tablet (12.5 mg total) by mouth once daily. 30 tablet 11    ibuprofen (ADVIL,MOTRIN) 800 MG tablet Take 1 tablet (800 mg total) by mouth 3 (three) times daily as needed for Pain. 90 tablet 11    rosuvastatin (CRESTOR) 10 MG tablet Take 1 tablet (10 mg total) by mouth once daily. 90 tablet 3     No current facility-administered medications on file prior to visit.   [2]   Social History  Socioeconomic History    Marital status: Single   Tobacco Use    Smoking status: Former     Types: Cigarettes    Smokeless tobacco: Never   Substance and  Sexual Activity    Alcohol use: Yes    Drug use: Never    Sexual activity: Yes     Partners: Male     Social Drivers of Health     Financial Resource Strain: Low Risk  (5/6/2025)    Overall Financial Resource Strain (CARDIA)     Difficulty of Paying Living Expenses: Not very hard   Food Insecurity: No Food Insecurity (5/6/2025)    Hunger Vital Sign     Worried About Running Out of Food in the Last Year: Never true     Ran Out of Food in the Last Year: Never true   Transportation Needs: No Transportation Needs (5/6/2025)    PRAPARE - Transportation     Lack of Transportation (Medical): No     Lack of Transportation (Non-Medical): No   Physical Activity: Insufficiently Active (5/6/2025)    Exercise Vital Sign     Days of Exercise per Week: 2 days     Minutes of Exercise per Session: 20 min   Stress: No Stress Concern Present (5/6/2025)    [employer] - Occupational Stress Questionnaire     Feeling of Stress : Only a little   Housing Stability: Low Risk  (5/6/2025)    Housing Stability Vital Sign     Unable to Pay for Housing in the Last Year: No     Number of Times Moved in the Last Year: 0     Homeless in the Last Year: No

## 2025-08-11 ENCOUNTER — TELEPHONE (OUTPATIENT)
Dept: FAMILY MEDICINE | Facility: CLINIC | Age: 55
End: 2025-08-11
Payer: COMMERCIAL

## 2025-08-12 ENCOUNTER — LAB VISIT (OUTPATIENT)
Dept: LAB | Facility: HOSPITAL | Age: 55
End: 2025-08-12
Attending: NURSE PRACTITIONER
Payer: COMMERCIAL

## 2025-08-12 DIAGNOSIS — R73.03 PREDIABETES: ICD-10-CM

## 2025-08-12 DIAGNOSIS — Z00.00 ANNUAL PHYSICAL EXAM: ICD-10-CM

## 2025-08-12 DIAGNOSIS — E55.9 VITAMIN D DEFICIENCY: ICD-10-CM

## 2025-08-12 LAB
25(OH)D3+25(OH)D2 SERPL-MCNC: 51 NG/ML (ref 30–80)
ALBUMIN SERPL-MCNC: 3.9 G/DL (ref 3.5–5)
ALBUMIN/GLOB SERPL: 1.3 RATIO (ref 1.1–2)
ALP SERPL-CCNC: 30 UNIT/L (ref 40–150)
ALT SERPL-CCNC: 50 UNIT/L (ref 0–55)
ANION GAP SERPL CALC-SCNC: 8 MEQ/L
AST SERPL-CCNC: 29 UNIT/L (ref 11–45)
BASOPHILS # BLD AUTO: 0.02 X10(3)/MCL
BASOPHILS NFR BLD AUTO: 0.3 %
BILIRUB SERPL-MCNC: 0.4 MG/DL
BUN SERPL-MCNC: 21 MG/DL (ref 9.8–20.1)
CALCIUM SERPL-MCNC: 9.4 MG/DL (ref 8.4–10.2)
CHLORIDE SERPL-SCNC: 104 MMOL/L (ref 98–107)
CHOLEST SERPL-MCNC: 148 MG/DL
CHOLEST/HDLC SERPL: 5 {RATIO} (ref 0–5)
CO2 SERPL-SCNC: 27 MMOL/L (ref 22–29)
CREAT SERPL-MCNC: 0.76 MG/DL (ref 0.55–1.02)
CREAT/UREA NIT SERPL: 28
EOSINOPHIL # BLD AUTO: 0.21 X10(3)/MCL (ref 0–0.9)
EOSINOPHIL NFR BLD AUTO: 3.5 %
ERYTHROCYTE [DISTWIDTH] IN BLOOD BY AUTOMATED COUNT: 12.8 % (ref 11.5–17)
EST. AVERAGE GLUCOSE BLD GHB EST-MCNC: 105.4 MG/DL
GFR SERPLBLD CREATININE-BSD FMLA CKD-EPI: >60 ML/MIN/1.73/M2
GLOBULIN SER-MCNC: 2.9 GM/DL (ref 2.4–3.5)
GLUCOSE SERPL-MCNC: 93 MG/DL (ref 74–100)
HBA1C MFR BLD: 5.3 %
HCT VFR BLD AUTO: 41.1 % (ref 37–47)
HDLC SERPL-MCNC: 32 MG/DL (ref 35–60)
HGB BLD-MCNC: 13.9 G/DL (ref 12–16)
IMM GRANULOCYTES # BLD AUTO: 0.03 X10(3)/MCL (ref 0–0.04)
IMM GRANULOCYTES NFR BLD AUTO: 0.5 %
LDLC SERPL CALC-MCNC: 82 MG/DL (ref 50–140)
LYMPHOCYTES # BLD AUTO: 1.81 X10(3)/MCL (ref 0.6–4.6)
LYMPHOCYTES NFR BLD AUTO: 30 %
MCH RBC QN AUTO: 30.7 PG (ref 27–31)
MCHC RBC AUTO-ENTMCNC: 33.8 G/DL (ref 33–36)
MCV RBC AUTO: 90.7 FL (ref 80–94)
MONOCYTES # BLD AUTO: 0.56 X10(3)/MCL (ref 0.1–1.3)
MONOCYTES NFR BLD AUTO: 9.3 %
NEUTROPHILS # BLD AUTO: 3.41 X10(3)/MCL (ref 2.1–9.2)
NEUTROPHILS NFR BLD AUTO: 56.4 %
PLATELET # BLD AUTO: 238 X10(3)/MCL (ref 130–400)
PMV BLD AUTO: 9.2 FL (ref 7.4–10.4)
POTASSIUM SERPL-SCNC: 3.8 MMOL/L (ref 3.5–5.1)
PROT SERPL-MCNC: 6.8 GM/DL (ref 6.4–8.3)
RBC # BLD AUTO: 4.53 X10(6)/MCL (ref 4.2–5.4)
SODIUM SERPL-SCNC: 139 MMOL/L (ref 136–145)
TRIGL SERPL-MCNC: 172 MG/DL (ref 37–140)
TSH SERPL-ACNC: 1.92 UIU/ML (ref 0.35–4.94)
VLDLC SERPL CALC-MCNC: 34 MG/DL
WBC # BLD AUTO: 6.04 X10(3)/MCL (ref 4.5–11.5)

## 2025-08-12 PROCEDURE — 80061 LIPID PANEL: CPT

## 2025-08-12 PROCEDURE — 85025 COMPLETE CBC W/AUTO DIFF WBC: CPT

## 2025-08-12 PROCEDURE — 83036 HEMOGLOBIN GLYCOSYLATED A1C: CPT

## 2025-08-12 PROCEDURE — 80053 COMPREHEN METABOLIC PANEL: CPT

## 2025-08-12 PROCEDURE — 82306 VITAMIN D 25 HYDROXY: CPT

## 2025-08-12 PROCEDURE — 84443 ASSAY THYROID STIM HORMONE: CPT

## 2025-08-12 PROCEDURE — 36415 COLL VENOUS BLD VENIPUNCTURE: CPT

## 2025-08-15 PROBLEM — Z00.00 ANNUAL PHYSICAL EXAM: Status: ACTIVE | Noted: 2025-08-15

## 2025-08-18 ENCOUNTER — OFFICE VISIT (OUTPATIENT)
Dept: FAMILY MEDICINE | Facility: CLINIC | Age: 55
End: 2025-08-18
Payer: COMMERCIAL

## 2025-08-18 VITALS
SYSTOLIC BLOOD PRESSURE: 107 MMHG | OXYGEN SATURATION: 97 % | HEART RATE: 71 BPM | RESPIRATION RATE: 16 BRPM | WEIGHT: 184.63 LBS | HEIGHT: 69 IN | DIASTOLIC BLOOD PRESSURE: 69 MMHG | BODY MASS INDEX: 27.35 KG/M2

## 2025-08-18 DIAGNOSIS — Z00.00 ANNUAL PHYSICAL EXAM: Primary | ICD-10-CM

## 2025-08-18 DIAGNOSIS — E55.9 VITAMIN D DEFICIENCY: ICD-10-CM

## 2025-08-18 DIAGNOSIS — Z23 NEED FOR SHINGLES VACCINE: ICD-10-CM

## 2025-08-18 DIAGNOSIS — I10 PRIMARY HYPERTENSION: ICD-10-CM

## 2025-08-18 DIAGNOSIS — R73.03 PREDIABETES: ICD-10-CM

## 2025-08-18 DIAGNOSIS — E66.09 CLASS 1 OBESITY DUE TO EXCESS CALORIES WITH SERIOUS COMORBIDITY AND BODY MASS INDEX (BMI) OF 31.0 TO 31.9 IN ADULT: ICD-10-CM

## 2025-08-18 DIAGNOSIS — E78.2 MIXED HYPERLIPIDEMIA: ICD-10-CM

## 2025-08-18 DIAGNOSIS — R93.1 ELEVATED CORONARY ARTERY CALCIUM SCORE: ICD-10-CM

## 2025-08-18 DIAGNOSIS — E66.811 CLASS 1 OBESITY DUE TO EXCESS CALORIES WITH SERIOUS COMORBIDITY AND BODY MASS INDEX (BMI) OF 31.0 TO 31.9 IN ADULT: ICD-10-CM

## 2025-08-18 DIAGNOSIS — Z12.31 BREAST CANCER SCREENING BY MAMMOGRAM: ICD-10-CM

## 2025-08-18 DIAGNOSIS — Z23 NEED FOR PNEUMOCOCCAL VACCINATION: ICD-10-CM

## 2025-08-18 PROCEDURE — 1159F MED LIST DOCD IN RCRD: CPT | Mod: CPTII,,, | Performed by: NURSE PRACTITIONER

## 2025-08-18 PROCEDURE — 90677 PCV20 VACCINE IM: CPT | Mod: ,,, | Performed by: NURSE PRACTITIONER

## 2025-08-18 PROCEDURE — 4010F ACE/ARB THERAPY RXD/TAKEN: CPT | Mod: CPTII,,, | Performed by: NURSE PRACTITIONER

## 2025-08-18 PROCEDURE — 90472 IMMUNIZATION ADMIN EACH ADD: CPT | Mod: ,,, | Performed by: NURSE PRACTITIONER

## 2025-08-18 PROCEDURE — 3008F BODY MASS INDEX DOCD: CPT | Mod: CPTII,,, | Performed by: NURSE PRACTITIONER

## 2025-08-18 PROCEDURE — 99396 PREV VISIT EST AGE 40-64: CPT | Mod: 25,,, | Performed by: NURSE PRACTITIONER

## 2025-08-18 PROCEDURE — 1160F RVW MEDS BY RX/DR IN RCRD: CPT | Mod: CPTII,,, | Performed by: NURSE PRACTITIONER

## 2025-08-18 PROCEDURE — 3074F SYST BP LT 130 MM HG: CPT | Mod: CPTII,,, | Performed by: NURSE PRACTITIONER

## 2025-08-18 PROCEDURE — 3078F DIAST BP <80 MM HG: CPT | Mod: CPTII,,, | Performed by: NURSE PRACTITIONER

## 2025-08-18 PROCEDURE — 90750 HZV VACC RECOMBINANT IM: CPT | Mod: ,,, | Performed by: NURSE PRACTITIONER

## 2025-08-18 PROCEDURE — 90471 IMMUNIZATION ADMIN: CPT | Mod: ,,, | Performed by: NURSE PRACTITIONER

## 2025-08-18 PROCEDURE — 3044F HG A1C LEVEL LT 7.0%: CPT | Mod: CPTII,,, | Performed by: NURSE PRACTITIONER

## 2025-08-18 RX ORDER — ROSUVASTATIN CALCIUM 20 MG/1
20 TABLET, COATED ORAL DAILY
Qty: 90 TABLET | Refills: 3 | Status: SHIPPED | OUTPATIENT
Start: 2025-08-18 | End: 2026-08-18